# Patient Record
Sex: FEMALE | Race: WHITE | URBAN - METROPOLITAN AREA
[De-identification: names, ages, dates, MRNs, and addresses within clinical notes are randomized per-mention and may not be internally consistent; named-entity substitution may affect disease eponyms.]

---

## 2019-12-26 ENCOUNTER — NURSE TRIAGE (OUTPATIENT)
Dept: NURSING | Facility: CLINIC | Age: 59
End: 2019-12-26

## 2021-07-26 NOTE — TELEPHONE ENCOUNTER
"\"I have been sick with a sinus infection.\" Reporting she is on the last day of her antibiotic today. Patient took her thyroid medication and held it down for 2 hours. Reporting vomiting starting at 2 a.m. today. Patient has had 4 episodes of vomiting. Reporting diarrhea \" few times.\" Patient reporting mild bloating. Afebrile. Taking fluids. Reporting symptoms started with feeling of \"bad heart burn.\"   Patient is scheduled to take last dose of antibiotics this morning. Agrees to hold antibiotic until she is able to speak with [facility] directly after 9 a.m..    Sunshine Fuentes RN  [facility]      Additional Information    Negative: Shock suspected (e.g., cold/pale/clammy skin, too weak to stand, low BP, rapid pulse)    Negative: Difficult to awaken or acting confused (e.g., disoriented, slurred speech)    Negative: Sounds like a life-threatening emergency to the triager    Negative: SEVERE vomiting (e.g., 6 or more times/day)    Negative: MODERATE vomiting (e.g., 3 - 5 times/day) and age > 60    Negative: Vomiting contains bile (green color)    Negative: Vomiting red blood or black (coffee ground) material    Negative: Insulin-dependent diabetes and glucose > 240 mg/dl (13 mmol/l)    Negative: Recent head injury (within 3 days)    Negative: Recent abdominal injury (within 7 days)    Negative: Drinking very little and has signs of dehydration (e.g., no urine > 12 hours, very dry mouth, very lightheaded)    Negative: Constant abdominal pain lasting > 2 hours    Negative: High-risk adult (e.g., brain tumor, V-P shunt, hernia)    Negative: Severe pain in one eye    Negative: Patient sounds very sick or weak to the triager    Negative: Vomiting and abdomen looks much more swollen than usual    Negative: Fever > 103 F (39.4 C)    Negative: Fever > 101 F (38.3 C) and over 60 years of age    Negative: Fever > 100.0 F (37.8 C) and has a weak immune system (e.g., HIV positive, cancer chemo, organ transplant, " [FreeTextEntry1] : CAD/DM: No active cardiac complaints. Pt was seen in second opinion by Dr. Moore.\par DM: Medical management with Endocrinologist \par \par Lymphoma: followed at [facility]\par \par OV 3 months  splenectomy, chronic steroids)    Negative: Fever > 100.0 F (37.8 C) and bedridden (e.g., nursing home patient, stroke, chronic illness, recovering from surgery)    Negative: Taking any of the following medications: digoxin (Lanoxin), lithium, theophylline, phenytoin (Dilantin)    Negative: SEVERE headache and vomiting    Negative: MILD to MODERATE vomiting (e.g., 1-5 times/day) and lasts > 48 hours (2 days)    Negative: Fever present > 3 days (72 hours)    Negative: Patient wants to be seen    Negative: Vomiting a prescription medication    Negative: Alcohol abuse, known or suspected    Negative: Vomiting is a chronic symptom (recurrent or ongoing AND lasting > 4 weeks)    Negative: Vomiting    Vomiting with diarrhea    Protocols used: VOMITING-A-OH